# Patient Record
(demographics unavailable — no encounter records)

---

## 2024-10-24 NOTE — PHYSICAL EXAM
[Normal Sclera/Conjunctiva] : normal sclera/conjunctiva [PERRL] : pupils equal round and reactive to light [Normal Posterior Cervical Nodes] : no posterior cervical lymphadenopathy [Normal Anterior Cervical Nodes] : no anterior cervical lymphadenopathy [Normal] : no joint swelling and grossly normal strength and tone [Coordination Grossly Intact] : coordination grossly intact [No Focal Deficits] : no focal deficits [Normal Gait] : normal gait [Normal Affect] : the affect was normal [Normal Insight/Judgement] : insight and judgment were intact

## 2024-10-24 NOTE — PAST MEDICAL HISTORY
[Menstruating] : menstruating [Definite ___ (Date)] : the last menstrual period was [unfilled] [Irregular Cycle Intervals] : are  irregular [Dysmenorrhea] : dysmenorrhea [Total Preg ___] : G[unfilled] [Live Births ___] : P[unfilled]  [AB Spont ___] : miscarriages: [unfilled]

## 2024-10-28 NOTE — HEALTH RISK ASSESSMENT
[Good] : ~his/her~  mood as  good [No] : No [Never (0 pts)] : Never (0 points) [No falls in past year] : Patient reported no falls in the past year [0] : 2) Feeling down, depressed, or hopeless: Not at all (0) [PHQ-2 Negative - No further assessment needed] : PHQ-2 Negative - No further assessment needed [Never] : Never [NO] : No [Patient reported PAP Smear was abnormal] : Patient reported PAP Smear was abnormal [HIV Test offered] : HIV Test offered [Hepatitis C test offered] : Hepatitis C test offered [With Family] : lives with family [# of Members in Household ___] :  household currently consist of [unfilled] member(s) [Employed] : employed [Significant Other] : lives with significant other [Sexually Active] : sexually active [Feels Safe at Home] : Feels safe at home [Reports changes in vision] : Reports changes in vision [Patient declined PAP Smear] : Patient declined PAP Smear [HIV test declined] : HIV test declined [Hepatitis C test declined] : Hepatitis C test declined [Fully functional (bathing, dressing, toileting, transferring, walking, feeding)] : Fully functional (bathing, dressing, toileting, transferring, walking, feeding) [Fully functional (using the telephone, shopping, preparing meals, housekeeping, doing laundry, using] : Fully functional and needs no help or supervision to perform IADLs (using the telephone, shopping, preparing meals, housekeeping, doing laundry, using transportation, managing medications and managing finances) [[id number]] : 0 [Reports changes in hearing] : Reports no changes in hearing [PapSmearDate] : 11/2023 [PapSmearComments] : colpo Feb 2024 showing CIN1/2 [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

## 2024-10-28 NOTE — HISTORY OF PRESENT ILLNESS
[FreeTextEntry1] : CPE [de-identified] : 32 yo f with hx of temporal lobe epilepsy, BEBE 2/3 presenting for CPE. She reports she has issues seeing far, does not wear glasses.

## 2024-10-28 NOTE — HEALTH RISK ASSESSMENT
[Good] : ~his/her~  mood as  good [No] : No [Never (0 pts)] : Never (0 points) [No falls in past year] : Patient reported no falls in the past year [0] : 2) Feeling down, depressed, or hopeless: Not at all (0) [PHQ-2 Negative - No further assessment needed] : PHQ-2 Negative - No further assessment needed [Never] : Never [NO] : No [Patient reported PAP Smear was abnormal] : Patient reported PAP Smear was abnormal [HIV Test offered] : HIV Test offered [Hepatitis C test offered] : Hepatitis C test offered [With Family] : lives with family [# of Members in Household ___] :  household currently consist of [unfilled] member(s) [Employed] : employed [Significant Other] : lives with significant other [Sexually Active] : sexually active [Feels Safe at Home] : Feels safe at home [Reports changes in vision] : Reports changes in vision [Patient declined PAP Smear] : Patient declined PAP Smear [HIV test declined] : HIV test declined [Hepatitis C test declined] : Hepatitis C test declined [Fully functional (bathing, dressing, toileting, transferring, walking, feeding)] : Fully functional (bathing, dressing, toileting, transferring, walking, feeding) [Fully functional (using the telephone, shopping, preparing meals, housekeeping, doing laundry, using] : Fully functional and needs no help or supervision to perform IADLs (using the telephone, shopping, preparing meals, housekeeping, doing laundry, using transportation, managing medications and managing finances) [[id number]] : 0 [Reports changes in hearing] : Reports no changes in hearing [PapSmearDate] : 11/2023 [PapSmearComments] : colpo Feb 2024 showing CIN1/2 [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

## 2024-10-28 NOTE — HISTORY OF PRESENT ILLNESS
[FreeTextEntry1] : CPE [de-identified] : 32 yo f with hx of temporal lobe epilepsy, BEBE 2/3 presenting for CPE. She reports she has issues seeing far, does not wear glasses.

## 2024-10-28 NOTE — HISTORY OF PRESENT ILLNESS
[FreeTextEntry1] : CPE [de-identified] : 32 yo f with hx of temporal lobe epilepsy, BEBE 2/3 presenting for CPE. She reports she has issues seeing far, does not wear glasses.

## 2024-10-28 NOTE — INTERPRETER SERVICES
[[employer] ] : provided by [employer]   [Interpreters_IDNumber] : [id number] [Interpreters_FullName] : imelda baehna  [TWNoteComboBox1] : Italian

## 2024-10-28 NOTE — HEALTH RISK ASSESSMENT
[Good] : ~his/her~  mood as  good [No] : No [Never (0 pts)] : Never (0 points) [No falls in past year] : Patient reported no falls in the past year [0] : 2) Feeling down, depressed, or hopeless: Not at all (0) [PHQ-2 Negative - No further assessment needed] : PHQ-2 Negative - No further assessment needed [Never] : Never [NO] : No [Patient reported PAP Smear was abnormal] : Patient reported PAP Smear was abnormal [HIV Test offered] : HIV Test offered [Hepatitis C test offered] : Hepatitis C test offered [With Family] : lives with family [# of Members in Household ___] :  household currently consist of [unfilled] member(s) [Employed] : employed [Significant Other] : lives with significant other [Sexually Active] : sexually active [Feels Safe at Home] : Feels safe at home [Reports changes in vision] : Reports changes in vision [Patient declined PAP Smear] : Patient declined PAP Smear [HIV test declined] : HIV test declined [Hepatitis C test declined] : Hepatitis C test declined [Fully functional (bathing, dressing, toileting, transferring, walking, feeding)] : Fully functional (bathing, dressing, toileting, transferring, walking, feeding) [Fully functional (using the telephone, shopping, preparing meals, housekeeping, doing laundry, using] : Fully functional and needs no help or supervision to perform IADLs (using the telephone, shopping, preparing meals, housekeeping, doing laundry, using transportation, managing medications and managing finances) [[id number]] : 0 [Reports changes in hearing] : Reports no changes in hearing [PapSmearDate] : 11/2023 [PapSmearComments] : colpo Feb 2024 showing CIN1/2 [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

## 2024-10-28 NOTE — INTERPRETER SERVICES
[[employer] ] : provided by [employer]   [Interpreters_IDNumber] : [id number] [Interpreters_FullName] : imelda bahena  [TWNoteComboBox1] : Belizean

## 2024-10-28 NOTE — INTERPRETER SERVICES
[[employer] ] : provided by [employer]   [Interpreters_IDNumber] : [id number] [Interpreters_FullName] : imelda bahena  [TWNoteComboBox1] : Pakistani

## 2025-01-17 NOTE — ASSESSMENT
[FreeTextEntry1] : SHARON GARCIA, 31 yo RHF PMHs significant for KENYON July 2020, right temporal with two typical complex partial seizure about in May and October 2021, nocturnal convulsions April 2024 Lacosamide 200 mg bid and Zonisamide 200 mg qhs well tolerated.  Last seizure 6 months ago. brief aura, feeling seizurish.  IUP 3-4 weeks pregnant   We extensively discussed that there are no adequate and well-controlled studies on use of zonisamide or lacosamide in pregnant women, however, animal reproductive studies show evidence of fetal malformations and loss of pregnancy. Benefit outweigh the risk, advised to continue Lacosamide 200 mg bid and Zonisamide 200 mg qhs. Patient verbalizes understanding.    Plan: - Plan of care was discussed with Dr Devine attending. - Con Lacosamide 200 mg q12.well tolerated  - Cont Zonisamide 200 qhs well tolerated, knows to hydrate.  - ordered cbc cmp, lft trough level asap, prev office visit has  April 2024 3 convulsions in the same day while she was asleep which patient has no recollections - discussed with patient will repeat levels 2nd trimester then 3 rd  - will consider increase of ZNS if low level, or aura reoccur - Cont folic acid - Establish OB care - reviewed seizure triggers -  all questions answered - MRI Head w/wo will hold off  - RTC with NP Farole end of April 2025, know to reach out sooner prn   x time 35 min

## 2025-01-17 NOTE — HISTORY OF PRESENT ILLNESS
[FreeTextEntry1] : Current meds: Lacosamide 200 mg bid well tolerated Zonisamide 200 mg qhs well tolerated Folic acid 1 mg  Prev  LEV Lamotrigine    ***2025*** accompanied by sister who helps to interpret SHARON ROSA, 31 yo RHF PMHs significant for KENYON  2020, right temporal.  She is pregnant about 3-4 weeks confirmed by home pregnancy test, she has not seen OB yet   She feels good. She has one healthy child 17 yo. she has not been on ASMs at that time, her seizures started afterwards.   Seizures started at age 18 had convulsive seizures, then had KENYON afterwards has brief auras, last 6 months ago. She is compliant with Lacosamide 200 mg bid and Zonisamide 200 mg qhs well tolerated, also on folic acid.     ***4/15/2024*** Ms Sharon Rosa is here today for a scheduled follow up office visit [id number] [place] [address]  disconnected and reconnected with  ID# /[id number] Guilherme Ms Hinds was pregnant but had an  on  2024   4 or 5 days before  she had 3 convulsions in the same day while she was asleep She reports one ne other reported episode of confusion/ [place] for one minute about one year ago  Lacosamide 200 mg BID Zonosamide 200mg daily  *** 2022  ***  Ms. ROSA reports that she has not had any additional seizures since her last visit 2-1/2 months ago.  She is tolerating the increased dose of zonisamide 200 mg at bedtime without side effects.  She continues taking lacosamide 200 mg twice a day. Ms. ROSA endorses that she continues having problems with her memory, and is often frustrated that simple tasks take her much longer to perform than she feels they should.  She is not having any pressure from her workplace, on the contrary they often ask her why she is so upset.  At last visit, she was given referral to see neuropsychology for follow-up testing, but has not made an appointment.  *** 2021  ***  Ms. ROSA reports that she had break through seizure on Oct 4 2021. She does not think that she missed doses or was sleep deprived. currently taking Vimpat 200 q12, zonisamide 100 mg qHS.   Ms. ROSA endorses subjectively worse memory, and that she is slow to do things-suggesting possibly some executive dysfunction. Ms. ROSA is not currently planning to have children, but does not rule out the possibility in the future.  *** 06/15/2021  ***  Ms. ROSA reports only 1 seizure since surgery 2020 (KENYON).  Ms. ROSA was very stressed over the 2 days prior to seizure.  Relative reports that she postured, lasted about 30 sec, typical semiology.    Vimpat 200 mg q12 zonisamide 100 mg qHS folic acid.  *** 2020  *** -Appointment was conducted by video-conference in place of office appointment due to due to heightened concern for coronavirus infection risk. -Verbal consent given on May 24, 2020 at 15:41 by the patient SHARON ROSA ( Dec 25 1992) who understands that tele-visit will be charged to insurance and may involve co-pay for patient. -Video Platform: "[employer]  Doxy.[email or web address] Ng  -Physician location: home -Patient location: home - [address] [place] [address]  -Individuals on call: Dr. Serna, SHARON ROSA underwent stereo-EEG last January -  seizures recorded, 14/16 began in R mesial temporal structures with secondary initiatin of seizure in left hemisphere, 2/14 were seen first in left mesial temporal contacts - thougth implain in the left was sparse compared to the right. Ms. ROSA has continued to have seizures over the last few months. Approximately 1-2 per month. No convulsions.  Ms. ROSA is interested in surgery, and has met with Dr. Felipe to discuss options. Currently considering R temporal resection, though alternative could be RNS for further data collection.  On the other hand, R temporal lobectomy could be followed by RNS if residual seizures were not controlled by medication.   Ms. ROSA identifies memory loss as one of her greatest concerns and problems.   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 She was going to be evaluated for epilepsy surgery, but became uninsured. She is now again insured and returns for follow-up.  She reports that she had convulsion this morning and continues to get frequent complex partial seizures. She also complains of marked worsening of memory. She had recent neuropsychology evaluation, results pending.    *** 2018 *** 26 y/o RH woman from [place] with history of temporal lobe epilepsy diagnosed with at age 16. First episode happened at 9yo, described as confused feeling, with feeling of maddi vu and throat tightness without motor symptoms. She reports her events now consist of a as a preceding aura of a maddi-vu feeling lump in her throat followed by sense of confusion, stiffening of the body and oral automatisms.   Seizures last 1-2 mins. She has 3-5 of her typical seizures monthly, especially clustering around the time of her periods. Since increasing her dose of keppra and lamotrigine at discharge 5d ago pt has had a typical aura but did not progress.  Patient recalls that she may have seizures if she get more emotional or upset.  Seizures are usually worst guillermo-menstrually, usually 3-4 days before menses.    Current AEDs - Patient is currently taking Keppra 750 mg BID and lamotrigine 100 mg BID  Past AEDs - Keppra, carbamazepine (did not seem to help as much - got more post-ictal HA while taking cbz), phenobarbital, phenytoin. Has not tried other AEDs.  All - None  Soc - tobacco, no ETOH  FH - [place]  ROS - review of 14 systems negative.  *** Recent Hospital Eval *** 26 y/o RH woman from [place] with history of temporal lobe epilepsy admitted to EMU s/p multiple seizures today. Pt was diagnosed with temporal lobe epilepsy at age 16. She reports her events as a preceding aura of a clump in her throat followed by sense of confusion and witnessed tonic clonic activity in all extremities. Oral automatisms may have been observed. Events last 1-2 mins. In the past she was treated with Carbamazepine, Dilantin, and Phenobarbital (unclear if intolerance or medication failure). Patient is currently taking Keppra 750 mg BID and lamotrigine 100 mg BID. She continues to have 3-5 of her typical seizures monthly, especially clustering around the time of her periods.  Patient had 5 witnessed seizures at home this month. Initially they were typical length and she returned to baseline mental status afterwards. Her last 3 seizures lasted 5 mins each, were clustered together, and she did not return to baseline mental status. She was taken to [facility], where she had another seizure. There, she received 2 mg ativan and 500 mg phenobarbital IV. CTH was normal.

## 2025-01-17 NOTE — HISTORY OF PRESENT ILLNESS
[FreeTextEntry1] : Current meds: Lacosamide 200 mg bid well tolerated Zonisamide 200 mg qhs well tolerated Folic acid 1 mg  Prev  LEV Lamotrigine    ***2025*** accompanied by sister who helps to interpret SHARON ROSA, 33 yo RHF PMHs significant for KENYON  2020, right temporal.  She is pregnant about 3-4 weeks confirmed by home pregnancy test, she has not seen OB yet   She feels good. She has one healthy child 17 yo. she has not been on ASMs at that time, her seizures started afterwards.   Seizures started at age 18 had convulsive seizures, then had KENYON afterwards has brief auras, last 6 months ago. She is compliant with Lacosamide 200 mg bid and Zonisamide 200 mg qhs well tolerated, also on folic acid.     ***4/15/2024*** Ms Sharon Rosa is here today for a scheduled follow up office visit [id number] [place] [address]  disconnected and reconnected with  ID# /[id number] Guilherme Ms Hinds was pregnant but had an  on  2024   4 or 5 days before  she had 3 convulsions in the same day while she was asleep She reports one ne other reported episode of confusion/ [place] for one minute about one year ago  Lacosamide 200 mg BID Zonosamide 200mg daily  *** 2022  ***  Ms. ROSA reports that she has not had any additional seizures since her last visit 2-1/2 months ago.  She is tolerating the increased dose of zonisamide 200 mg at bedtime without side effects.  She continues taking lacosamide 200 mg twice a day. Ms. ROSA endorses that she continues having problems with her memory, and is often frustrated that simple tasks take her much longer to perform than she feels they should.  She is not having any pressure from her workplace, on the contrary they often ask her why she is so upset.  At last visit, she was given referral to see neuropsychology for follow-up testing, but has not made an appointment.  *** 2021  ***  Ms. ROSA reports that she had break through seizure on Oct 4 2021. She does not think that she missed doses or was sleep deprived. currently taking Vimpat 200 q12, zonisamide 100 mg qHS.   Ms. ROSA endorses subjectively worse memory, and that she is slow to do things-suggesting possibly some executive dysfunction. Ms. ROSA is not currently planning to have children, but does not rule out the possibility in the future.  *** 06/15/2021  ***  Ms. ROSA reports only 1 seizure since surgery 2020 (KENYON).  Ms. ROSA was very stressed over the 2 days prior to seizure.  Relative reports that she postured, lasted about 30 sec, typical semiology.    Vimpat 200 mg q12 zonisamide 100 mg qHS folic acid.  *** 2020  *** -Appointment was conducted by video-conference in place of office appointment due to due to heightened concern for coronavirus infection risk. -Verbal consent given on May 24, 2020 at 15:41 by the patient SHARON ROSA ( Dec 25 1992) who understands that tele-visit will be charged to insurance and may involve co-pay for patient. -Video Platform: [employer]  Doxy.[email or web address] Ng  -Physician location: home -Patient location: home - [address] [place] [address]  -Individuals on call: Dr. Serna, SHARON ROSA underwent stereo-EEG last January -  seizures recorded, 14/16 began in R mesial temporal structures with secondary initiatin of seizure in left hemisphere, 2/14 were seen first in left mesial temporal contacts - thougth implain in the left was sparse compared to the right. Ms. ROSA has continued to have seizures over the last few months. Approximately 1-2 per month. No convulsions.  Ms. ROSA is interested in surgery, and has met with Dr. Felipe to discuss options. Currently considering R temporal resection, though alternative could be RNS for further data collection.  On the other hand, R temporal lobectomy could be followed by RNS if residual seizures were not controlled by medication.   Ms. ROSA identifies memory loss as one of her greatest concerns and problems.   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 She was going to be evaluated for epilepsy surgery, but became uninsured. She is now again insured and returns for follow-up.  She reports that she had convulsion this morning and continues to get frequent complex partial seizures. She also complains of marked worsening of memory. She had recent neuropsychology evaluation, results pending.    *** 2018 *** 26 y/o RH woman from [place] with history of temporal lobe epilepsy diagnosed with at age 16. First episode happened at 9yo, described as confused feeling, with feeling of maddi vu and throat tightness without motor symptoms. She reports her events now consist of a as a preceding aura of a maddi-vu feeling lump in her throat followed by sense of confusion, stiffening of the body and oral automatisms.   Seizures last 1-2 mins. She has 3-5 of her typical seizures monthly, especially clustering around the time of her periods. Since increasing her dose of keppra and lamotrigine at discharge 5d ago pt has had a typical aura but did not progress.  Patient recalls that she may have seizures if she get more emotional or upset.  Seizures are usually worst guillermo-menstrually, usually 3-4 days before menses.    Current AEDs - Patient is currently taking Keppra 750 mg BID and lamotrigine 100 mg BID  Past AEDs - Keppra, carbamazepine (did not seem to help as much - got more post-ictal HA while taking cbz), phenobarbital, phenytoin. Has not tried other AEDs.  All - None  Soc - tobacco, no ETOH  FH - [place]  ROS - review of 14 systems negative.  *** Recent Hospital Eval *** 26 y/o RH woman from [place] with history of temporal lobe epilepsy admitted to EMU s/p multiple seizures today. Pt was diagnosed with temporal lobe epilepsy at age 16. She reports her events as a preceding aura of a clump in her throat followed by sense of confusion and witnessed tonic clonic activity in all extremities. Oral automatisms may have been observed. Events last 1-2 mins. In the past she was treated with Carbamazepine, Dilantin, and Phenobarbital (unclear if intolerance or medication failure). Patient is currently taking Keppra 750 mg BID and lamotrigine 100 mg BID. She continues to have 3-5 of her typical seizures monthly, especially clustering around the time of her periods.  Patient had 5 witnessed seizures at home this month. Initially they were typical length and she returned to baseline mental status afterwards. Her last 3 seizures lasted 5 mins each, were clustered together, and she did not return to baseline mental status. She was taken to [facility], where she had another seizure. There, she received 2 mg ativan and 500 mg phenobarbital IV. CTH was normal.

## 2025-01-31 NOTE — DISCUSSION/SUMMARY
[FreeTextEntry1] : 32-year-old -0-0-1 LMP 2024 EDC 9/10/2025 8 weeks 1 day today Positive pregnancy test, +SIUP on pelvic us History of epilepsy on meds refer to MFM Follow-up with neurology Follow-up 2 weeks as a new OB visit Nips at that time NT OB ultrasound

## 2025-01-31 NOTE — HISTORY OF PRESENT ILLNESS
[FreeTextEntry1] : 32-year-old -0-0-1 LMP 2024 EDC 9/10/2025 8 weeks 1 day today Patient has a positive urine pregnancy test here for confirmation of pregnancy History of convulsions epilepsy x 4 years patient currently on 2 medications are concerned about the medication and the pregnancy neurologist aware and discussed the medication during pregnancy Patient has an appointment regarding adjusting the medication because it makes him too low next week

## 2025-04-25 NOTE — HISTORY OF PRESENT ILLNESS
[FreeTextEntry1] : OB Laurie Zamarripa  Current meds: Lacosamide 200 mg bid Zonisamide 200 mg qhs  Folic acid Now pregnant 20 weeks   Prev  LEV Lamotrigine    *** 2025 **** use of  Vari # [id number].  SHARON ROSA, 31 yo RHF PMHs significant for KENYON 2020, right temporal, afterwards with brief auras. Now pregnant 20 weeks feels good. SKYLER 12/10/2025. No interval sz on Lacosamide 200 mg bid and Zonisamide 200 mg qhs. Needs updated level.    ***2025*** accompanied by sister who helps to interpret SHARON ROSA, 31 yo RHF PMHs significant for KENYON 2020, right temporal.  She is pregnant about 3-4 weeks confirmed by home pregnancy test, she has not seen OB yet   She feels good. She has one healthy child 15 yo. she has not been on ASMs at that time, her seizures started afterwards.   Seizures started at age 18 had convulsive seizures, then had KENYON afterwards has brief auras, last 6 months ago. She is compliant with Lacosamide 200 mg bid and Zonisamide 200 mg qhs well tolerated, also on folic acid.     ***4/15/2024*** Ms Sharon Rosa is here today for a scheduled follow up office visit [id number] Jaimie  disconnected and reconnected with  ID# /[id number] Guilherme Ms Hinds was pregnant but had an  on  2024   4 or 5 days before  she had 3 convulsions in the same day while she was asleep She reports one ne other reported episode of confusion/ [place] for one minute about one year ago  Lacosamide 200 mg BID Zonosamide 200mg daily  *** 2022  ***  Ms. ROSA reports that she has not had any additional seizures since her last visit 2-1/2 months ago.  She is tolerating the increased dose of zonisamide 200 mg at bedtime without side effects.  She continues taking lacosamide 200 mg twice a day. Ms. ROSA endorses that she continues having problems with her memory, and is often frustrated that simple tasks take her much longer to perform than she feels they should.  She is not having any pressure from her workplace, on the contrary they often ask her why she is so upset.  At last visit, she was given referral to see neuropsychology for follow-up testing, but has not made an appointment.  *** 2021  ***  Ms. ROSA reports that she had break through seizure on Oct 4 2021. She does not think that she missed doses or was sleep deprived. currently taking Vimpat 200 q12, zonisamide 100 mg qHS.   Ms. ROSA endorses subjectively worse memory, and that she is slow to do things-suggesting possibly some executive dysfunction. Ms. ROSA is not currently planning to have children, but does not rule out the possibility in the future.  *** 06/15/2021  ***  Ms. ROSA reports only 1 seizure since surgery 2020 (KENYON).  Ms. ROSA was very stressed over the 2 days prior to seizure.  Relative reports that she postured, lasted about 30 sec, typical semiology.    Vimpat 200 mg q12 zonisamide 100 mg qHS folic acid.  *** 2020  *** -Appointment was conducted by video-conference in place of office appointment due to due to heightened concern for coronavirus infection risk. -Verbal consent given on May 24, 2020 at 15:41 by the patient SHARON ROSA ( Dec 25 1992) who understands that tele-visit will be charged to insurance and may involve co-pay for patient. -Video Platform: [email or web address] Doximity  -Physician location: home -Patient location: [facility] - [address] [place] [address], [place] [address]  -Individuals on call: Dr. Serna, SHARON ROSA underwent stereo-EEG last January -  seizures recorded, 14/16 began in R mesial temporal structures with secondary initiatin of seizure in left hemisphere, 2/14 were seen first in left mesial temporal contacts - [facility] implain in the left was sparse compared to the right. Ms. ROSA has continued to have seizures over the last few months. Approximately 1-2 per month. No convulsions.  Ms. ROSA is interested in surgery, and has met with Dr. Felipe to discuss options. Currently considering R temporal resection, though alternative could be RNS for further data collection.  On the other hand, R temporal lobectomy could be followed by RNS if residual seizures were not controlled by medication.   Ms. ROSA identifies memory loss as one of her greatest concerns and problems.   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 She was going to be evaluated for epilepsy surgery, but became uninsured. She is now again insured and returns for follow-up.  She reports that she had convulsion this morning and continues to get frequent complex partial seizures. She also complains of marked worsening of memory. She had recent neuropsychology evaluation, results pending.    *** 2018 *** 24 y/o RH woman from [place] with history of temporal lobe epilepsy diagnosed with at age 16. First episode happened at 9yo, described as confused feeling, with feeling of maddi vu and throat tightness without motor symptoms. She reports her events now consist of a as a preceding aura of a maddi-vu feeling lump in her throat followed by sense of confusion, stiffening of the body and oral automatisms.   Seizures last 1-2 mins. She has 3-5 of her typical seizures monthly, especially clustering around the time of her periods. Since increasing her dose of keppra and lamotrigine at discharge 5d ago pt has had a typical aura but did not progress.  Patient recalls that she may have seizures if she get more emotional or upset.  Seizures are usually worst guillermo-menstrually, usually 3-4 days before menses.    Current AEDs - Patient is currently taking Keppra 750 mg BID and lamotrigine 100 mg BID  Past AEDs - Keppra, carbamazepine (did not seem to help as much - got more post-ictal HA while taking cbz), phenobarbital, phenytoin. Has not tried other AEDs.  All - None  Soc - tobacco, no ETOH  FH - NC  ROS - review of 14 systems negative.  *** Recent Hospital Eval *** 24 y/o RH woman from [place] with history of temporal lobe epilepsy admitted to EMU s/p multiple seizures today. Pt was diagnosed with temporal lobe epilepsy at age 16. She reports her events as a preceding aura of a clump in her throat followed by sense of confusion and witnessed tonic clonic activity in all extremities. Oral automatisms may have been observed. Events last 1-2 mins. In the past she was treated with Carbamazepine, Dilantin, and Phenobarbital (unclear if intolerance or medication failure). Patient is currently taking Keppra 750 mg BID and lamotrigine 100 mg BID. She continues to have 3-5 of her typical seizures monthly, especially clustering around the time of her periods.  Patient had 5 witnessed seizures at home this month. Initially they were typical length and she returned to baseline mental status afterwards. Her last 3 seizures lasted 5 mins each, were clustered together, and she did not return to baseline mental status. She was taken to [facility], where she had another seizure. There, she received 2 mg ativan and 500 mg phenobarbital IV. CTH was normal.

## 2025-04-25 NOTE — HISTORY OF PRESENT ILLNESS
[FreeTextEntry1] : OB Laurie Zamarripa  Current meds: Lacosamide 200 mg bid Zonisamide 200 mg qhs  Folic acid Now pregnant 20 weeks   Prev  LEV Lamotrigine    *** 2025 **** use of  Vari # [id number].  SHARON ROSA, 31 yo RHF PMHs significant for KENYON 2020, right temporal, afterwards with brief auras. Now pregnant 20 weeks feels good. SKYLER 12/10/2025. No interval sz on Lacosamide 200 mg bid and Zonisamide 200 mg qhs. Needs updated level.    ***2025*** accompanied by sister who helps to interpret SHARON ROSA, 31 yo RHF PMHs significant for KENYON 2020, right temporal.  She is pregnant about 3-4 weeks confirmed by home pregnancy test, she has not seen OB yet   She feels good. She has one healthy child 15 yo. she has not been on ASMs at that time, her seizures started afterwards.   Seizures started at age 18 had convulsive seizures, then had KENYON afterwards has brief auras, last 6 months ago. She is compliant with Lacosamide 200 mg bid and Zonisamide 200 mg qhs well tolerated, also on folic acid.     ***4/15/2024*** Ms Sharon Rosa is here today for a scheduled follow up office visit [id number] Jaimie  disconnected and reconnected with  ID# /[id number] Guilherme Ms Hinds was pregnant but had an  on  2024   4 or 5 days before  she had 3 convulsions in the same day while she was asleep She reports one ne other reported episode of confusion/ [place]javu for one minute about one year ago  Lacosamide 200 mg BID Zonosamide 200mg daily  *** 2022  ***  Ms. ROSA reports that she has not had any additional seizures since her last visit 2-1/2 months ago.  She is tolerating the increased dose of zonisamide 200 mg at bedtime without side effects.  She continues taking lacosamide 200 mg twice a day. Ms. ROSA endorses that she continues having problems with her memory, and is often frustrated that simple tasks take her much longer to perform than she feels they should.  She is not having any pressure from her workplace, on the contrary they often ask her why she is so upset.  At last visit, she was given referral to see neuropsychology for follow-up testing, but has not made an appointment.  *** 2021  ***  Ms. ROSA reports that she had break through seizure on Oct 4 2021. She does not think that she missed doses or was sleep deprived. currently taking Vimpat 200 q12, zonisamide 100 mg qHS.   Ms. ROSA endorses subjectively worse memory, and that she is slow to do things-suggesting possibly some executive dysfunction. Ms. ROSA is not currently planning to have children, but does not rule out the possibility in the future.  *** 06/15/2021  ***  Ms. ROSA reports only 1 seizure since surgery 2020 (KENYON).  Ms. ROSA was very stressed over the 2 days prior to seizure.  Relative reports that she postured, lasted about 30 sec, typical semiology.    Vimpat 200 mg q12 zonisamide 100 mg qHS folic acid.  *** 2020  *** -Appointment was conducted by video-conference in place of office appointment due to due to heightened concern for coronavirus infection risk. -Verbal consent given on May 24, 2020 at 15:41 by the patient SHARON ROSA ( Dec 25 1992) who understands that tele-visit will be charged to insurance and may involve co-pay for patient. -Video Platform: [email or web address] Doximity  -Physician location: home -Patient location: [facility] - [address] [place] [address], [place] [address]  -Individuals on call: Dr. Serna, SHARON ROSA underwent stereo-EEG last January -  seizures recorded, 14/16 began in R mesial temporal structures with secondary initiatin of seizure in left hemisphere, 2/14 were seen first in left mesial temporal contacts - [facility] implain in the left was sparse compared to the right. Ms. ROSA has continued to have seizures over the last few months. Approximately 1-2 per month. No convulsions.  Ms. ROSA is interested in surgery, and has met with Dr. Felipe to discuss options. Currently considering R temporal resection, though alternative could be RNS for further data collection.  On the other hand, R temporal lobectomy could be followed by RNS if residual seizures were not controlled by medication.   Ms. ROSA identifies memory loss as one of her greatest concerns and problems.   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 She was going to be evaluated for epilepsy surgery, but became uninsured. She is now again insured and returns for follow-up.  She reports that she had convulsion this morning and continues to get frequent complex partial seizures. She also complains of marked worsening of memory. She had recent neuropsychology evaluation, results pending.    *** 2018 *** 26 y/o RH woman from [place] with history of temporal lobe epilepsy diagnosed with at age 16. First episode happened at 9yo, described as confused feeling, with feeling of maddi vu and throat tightness without motor symptoms. She reports her events now consist of a as a preceding aura of a maddi-vu feeling lump in her throat followed by sense of confusion, stiffening of the body and oral automatisms.   Seizures last 1-2 mins. She has 3-5 of her typical seizures monthly, especially clustering around the time of her periods. Since increasing her dose of keppra and lamotrigine at discharge 5d ago pt has had a typical aura but did not progress.  Patient recalls that she may have seizures if she get more emotional or upset.  Seizures are usually worst guillermo-menstrually, usually 3-4 days before menses.    Current AEDs - Patient is currently taking Keppra 750 mg BID and lamotrigine 100 mg BID  Past AEDs - Keppra, carbamazepine (did not seem to help as much - got more post-ictal HA while taking cbz), phenobarbital, phenytoin. Has not tried other AEDs.  All - None  Soc - tobacco, no ETOH  FH - NC  ROS - review of 14 systems negative.  *** Recent Hospital Eval *** 26 y/o RH woman from [place] with history of temporal lobe epilepsy admitted to EMU s/p multiple seizures today. Pt was diagnosed with temporal lobe epilepsy at age 16. She reports her events as a preceding aura of a clump in her throat followed by sense of confusion and witnessed tonic clonic activity in all extremities. Oral automatisms may have been observed. Events last 1-2 mins. In the past she was treated with Carbamazepine, Dilantin, and Phenobarbital (unclear if intolerance or medication failure). Patient is currently taking Keppra 750 mg BID and lamotrigine 100 mg BID. She continues to have 3-5 of her typical seizures monthly, especially clustering around the time of her periods.  Patient had 5 witnessed seizures at home this month. Initially they were typical length and she returned to baseline mental status afterwards. Her last 3 seizures lasted 5 mins each, were clustered together, and she did not return to baseline mental status. She was taken to [facility], where she had another seizure. There, she received 2 mg ativan and 500 mg phenobarbital IV. CTH was normal.

## 2025-04-25 NOTE — ASSESSMENT
[FreeTextEntry1] : SHARON GARCIA, 33 yo RHF PMHs significant for KENYON July 2020, right temporal with two typical complex partial seizure about in May and October 2021, nocturnal convulsions April 2024.  On Lacosamide 200 mg bid and Zonisamide 200 mg qhs well tolerated.  Brief aura June July 2024, feeling seizurish.  IUP 20 weeks pregnant   We extensively discussed that there are no adequate and well-controlled studies on use of zonisamide or lacosamide in pregnant women, however, animal reproductive studies show evidence of fetal malformations and loss of pregnancy. Benefits outweigh the risk, advised to continue Lacosamide 200 mg bid and Zonisamide 200 mg qhs. Patient verbalizes understanding.  Pt admits that 01.28.2025 last level was done on Zonisamide 100 mg qhs  2.1 (10.0-  40.0) ZNS was increased to 200 mg qhs. As per pt level was repeated by OB normal (no report avail)  no adjustments were recommended.    Plan: - I reached out to OB Antoine Zamarripa, pt is at high risk for seiuzure d/t questionable compliance.  -  Cont  Lacosamide 200mg q12.well tolerated. Renewed. -  Cont Zonisamide 200 mg qhs well tolerated, knows to hydrate. Renewed. Compliance discussed. Unclear of pt's insight.   -  Ordered trough levels ASAP, Will consider increase of ZNS if low level, or aura reoccur. -  Will repeat levels 2nd trimester then 3rd   - Cont folic acid by OB recomm -  Reviewed seizure triggers -  all questions answered -  MRI Head w/wo will hold off  -  RTC in 3 months with Dr Devine, know to reach out sooner prn   x time 25 min

## 2025-04-25 NOTE — REASON FOR VISIT
[Follow-Up: _____] : a [unfilled] follow-up visit [Language Line ] : provided by Language Line   [Time Spent: ____ minutes] : Total time spent using  services: [unfilled] minutes. The patient's primary language is not English thus required  services. [Interpreters_IDNumber] : [id number] [Interpreters_FullName] : Vari [TWNoteComboBox1] : Azerbaijani

## 2025-04-25 NOTE — DISCUSSION/SUMMARY
[Well-controlled] : well-controlled [Risks Associated with Driving/[place] Law] : As per my usual protocol, the patient was advised in regards to risks and driving privileges associated with the [place] Guidelines.  [Safety Recommendations] : The patient was advised in regards to the risk of seizures and general seizure safety recommendations including not to be bathing alone, climbing to high places and operating heavy machinery. [Compliance with Medications] : The importance of compliance with medications was reinforced. [Medication Side Effects] : High frequency and serious potential medication adverse effects were reviewed with the patient, including but not exclusive to psychiatric effects.  Information sheets on medication side effects were made available to the patient in our clinic.  The patient or advocate agrees to notify us for any concerns. [Teratogenicity] : Risks associated with AED use in pregnancy, teratogenicity and methods of contraception were discussed.  [Sleep Hygiene/Sleep Disruption Risks] : Sleep hygiene and the risks of sleep disruption were discussed. [Risk of Death] : Risk of death associated with seizures / SUDEP was discussed.

## 2025-04-25 NOTE — ASSESSMENT
[FreeTextEntry1] : SHARON GARCIA, 31 yo RHF PMHs significant for KENYON July 2020, right temporal with two typical complex partial seizure about in May and October 2021, nocturnal convulsions April 2024.  On Lacosamide 200 mg bid and Zonisamide 200 mg qhs well tolerated.  Brief aura June July 2024, feeling seizurish.  IUP 20 weeks pregnant   We extensively discussed that there are no adequate and well-controlled studies on use of zonisamide or lacosamide in pregnant women, however, animal reproductive studies show evidence of fetal malformations and loss of pregnancy. Benefits outweigh the risk, advised to continue Lacosamide 200 mg bid and Zonisamide 200 mg qhs. Patient verbalizes understanding.  Pt admits that 01.28.2025 last level was done on Zonisamide 100 mg qhs  2.1 (10.0-  40.0) ZNS was increased to 200 mg qhs. As per pt level was repeated by OB normal (no report avail)  no adjustments were recommended.    Plan: - I reached out to OB Antoine Zamarripa, pt is at high risk for seiuzure d/t questionable compliance.  -  Cont  Lacosamide 200mg q12.well tolerated. Renewed. -  Cont Zonisamide 200 mg qhs well tolerated, knows to hydrate. Renewed. Compliance discussed. Unclear of pt's insight.   -  Ordered trough levels ASAP, Will consider increase of ZNS if low level, or aura reoccur. -  Will repeat levels 2nd trimester then 3rd   - Cont folic acid by OB recomm -  Reviewed seizure triggers -  all questions answered -  MRI Head w/wo will hold off  -  RTC in 3 months with Dr Devine, know to reach out sooner prn   x time 25 min

## 2025-04-25 NOTE — REASON FOR VISIT
[Follow-Up: _____] : a [unfilled] follow-up visit [Language Line ] : provided by Language Line   [Time Spent: ____ minutes] : Total time spent using  services: [unfilled] minutes. The patient's primary language is not English thus required  services. [Interpreters_IDNumber] : [id number] [Interpreters_FullName] : Vari [TWNoteComboBox1] : Beninese

## 2025-04-25 NOTE — HISTORY OF PRESENT ILLNESS
[FreeTextEntry1] : Current meds: Lacosamide 200 mg bid well tolerated Zonisamide 200 mg qhs well tolerated Folic acid 1 mg  Prev  LEV Lamotrigine    *** 2025 **** SHARON ROSA, 33 yo RHF PMHs significant for KENYON  2020, right temporal.     ***2025*** accompanied by sister who helps to interpret SHARON ROSA, 33 yo RHF PMHs significant for KENYON  2020, right temporal.  She is pregnant about 3-4 weeks confirmed by home pregnancy test, she has not seen OB yet   She feels good. She has one healthy child 15 yo. she has not been on ASMs at that time, her seizures started afterwards.   Seizures started at age 18 had convulsive seizures, then had KENYON afterwards has brief auras, last 6 months ago. She is compliant with Lacosamide 200 mg bid and Zonisamide 200 mg qhs well tolerated, also on folic acid.     ***4/15/2024*** Ms Sharon Rosa is here today for a scheduled follow up office visit [id number] Jaimie  disconnected and reconnected with  ID# /[id number] Guilherme Ms Hinds was pregnant but had an  on  2024   4 or 5 days before  she had 3 convulsions in the same day while she was asleep She reports one ne other reported episode of confusion/ [place]u for one minute about one year ago  Lacosamide 200 mg BID Zonosamide 200mg daily  *** 2022  ***  Ms. ROSA reports that she has not had any additional seizures since her last visit 2-1/2 months ago.  She is tolerating the increased dose of zonisamide 200 mg at bedtime without side effects.  She continues taking lacosamide 200 mg twice a day. Ms. ROSA endorses that she continues having problems with her memory, and is often frustrated that simple tasks take her much longer to perform than she feels they should.  She is not having any pressure from her workplace, on the contrary they often ask her why she is so upset.  At last visit, she was given referral to see neuropsychology for follow-up testing, but has not made an appointment.  *** 2021  ***  Ms. ROSA reports that she had break through seizure on Oct 4 2021. She does not think that she missed doses or was sleep deprived. currently taking Vimpat 200 q12, zonisamide 100 mg qHS.   Ms. ROSA endorses subjectively worse memory, and that she is slow to do things-suggesting possibly some executive dysfunction. Ms. ROSA is not currently planning to have children, but does not rule out the possibility in the future.  *** 06/15/2021  ***  Ms. ROSA reports only 1 seizure since surgery 2020 (KENYON).  Ms. ROSA was very stressed over the 2 days prior to seizure.  Relative reports that she postured, lasted about 30 sec, typical semiology.    Vimpat 200 mg q12 zonisamide 100 mg qHS folic acid.  *** 2020  *** -Appointment was conducted by video-conference in place of office appointment due to due to heightened concern for coronavirus infection risk. -Verbal consent given on May 24, 2020 at 15:41 by the patient SHARON ROSA ( Dec 25 1992) who understands that tele-visit will be charged to insurance and may involve co-pay for patient. -Video Platform: [email or web address]  -Physician location: home -Patient location: home - [address] [place] [address]  -Individuals on call: Dr. Serna, SHARON ROSA underwent stereo-EEG last January -  seizures recorded, 14/16 began in R mesial temporal structures with secondary initiatin of seizure in left hemisphere, 2/14 were seen first in left mesial temporal contacts - thougth implain in the left was sparse compared to the right. Ms. ROSA has continued to have seizures over the last few months. Approximately 1-2 per month. No convulsions.  Ms. ROSA is interested in surgery, and has met with Dr. Felipe to discuss options. Currently considering R temporal resection, though alternative could be RNS for further data collection.  On the other hand, R temporal lobectomy could be followed by RNS if residual seizures were not controlled by medication.   Ms. ROSA identifies memory loss as one of her greatest concerns and problems.   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 She was going to be evaluated for epilepsy surgery, but became uninsured. She is now again insured and returns for follow-up.  She reports that she had convulsion this morning and continues to get frequent complex partial seizures. She also complains of marked worsening of memory. She had recent neuropsychology evaluation, results pending.    *** 2018 *** 26 y/o RH woman from [place] with history of temporal lobe epilepsy diagnosed with at age 16. First episode happened at 9yo, described as confused feeling, with feeling of maddi vu and throat tightness without motor symptoms. She reports her events now consist of a as a preceding aura of a maddi-vu feeling lump in her throat followed by sense of confusion, stiffening of the body and oral automatisms.   Seizures last 1-2 mins. She has 3-5 of her typical seizures monthly, especially clustering around the time of her periods. Since increasing her dose of keppra and lamotrigine at discharge 5d ago pt has had a typical aura but did not progress.  Patient recalls that she may have seizures if she get more emotional or upset.  Seizures are usually worst guillermo-menstrually, usually 3-4 days before menses.    Current AEDs - Patient is currently taking Keppra 750 mg BID and lamotrigine 100 mg BID  Past AEDs - Keppra, carbamazepine (did not seem to help as much - got more post-ictal HA while taking cbz), phenobarbital, phenytoin. Has not tried other AEDs.  All - None  Soc - tobacco, no ETOH  FH - NC  ROS - review of 14 systems negative.  *** Recent Hospital Eval *** 26 y/o RH woman from [place] with history of temporal lobe epilepsy admitted to EMU s/p multiple seizures today. Pt was diagnosed with temporal lobe epilepsy at age 16. She reports her events as a preceding aura of a clump in her throat followed by sense of confusion and witnessed tonic clonic activity in all extremities. Oral automatisms may have been observed. Events last 1-2 mins. In the past she was treated with Carbamazepine, Dilantin, and Phenobarbital (unclear if intolerance or medication failure). Patient is currently taking Keppra 750 mg BID and lamotrigine 100 mg BID. She continues to have 3-5 of her typical seizures monthly, especially clustering around the time of her periods.  Patient had 5 witnessed seizures at home this month. Initially they were typical length and she returned to baseline mental status afterwards. Her last 3 seizures lasted 5 mins each, were clustered together, and she did not return to baseline mental status. She was taken to [facility], where she had another seizure. There, she received 2 mg ativan and 500 mg phenobarbital IV. CTH was normal.

## 2025-07-10 NOTE — PLAN
[FreeTextEntry1] : Patient is a 33yo  now PPD#21 s/p  at 28wks after IOL for IUFD. [facility]   #IUFD - Cytogenetics results still pending - APLS labs neg - Infectious labs overall unremarkable - Condolences and support provided today. Pt denies thoughts of harm to self or others. Does not wish to speak with social work today due to support felt with physician visit.  - Pt encouraged to schedule pre-conception counseling prior to trying to conceive to ensure from epilepsy standpoint she is optimized for a subsequent pregnancy. Phone numbers for both [facility] and [facility] provided. - No additional follow up in GYN clinic indicated  #h/o BEBE II - Normal pap in this pregnancy (NILM/HPV neg), repeat in 1yr  d/w attending, Dr. Kaye Farrar PGY4

## 2025-07-10 NOTE — HISTORY OF PRESENT ILLNESS
[FreeTextEntry1] : Patient is a 33yo  now PPD#21 s/p  at 28wks after IOL for IUFD. Patient had been following closely in [facility] for maternal history of temporal lobe epilepsy. She presented to [facility] for a routine scan and visit, was diagnosed with an IUFD. Pt then presented to [facility] triage for a second opinion and opted to stay for labor induction. Patient delivered intact non-viable fetus in double footling breech presentation, delivery overall uncomplicated. Pt sent cytogenetic testing and is anxious for results.   Otherwise reports she is physically recovering well. No longer bleeding vaginally or having breastmilk expression. Denies fevers, chills, abdominal pain, foul smelling vaginal discharge. She is appropriately sad though reports good support at home from sister, mother and partner.   No seizure activity since delivery.  PMH: Temporal lobe epilepsy Meds: Locasamide 200mg BID, Zonisamide 300mg PShx: laser ablation for epilepsy GYN hx: Fibroids, CIN2 (normal NILM/HPV neg pap in this pregnancy), Chlamydia 2x OB hx: FT  (), PT  for IUFD @28wks, SAB x1

## 2025-07-10 NOTE — PHYSICAL EXAM
[Appropriately responsive] : appropriately responsive [Alert] : alert [No Acute Distress] : no acute distress [Soft] : soft [Non-tender] : non-tender [Non-distended] : non-distended [Oriented x3] : oriented x3 [FreeTextEntry4] : well perfused [FreeTextEntry5] : normal respiratory effort on room air [FreeTextEntry7] : Pt declined pelvic exam.

## 2025-07-27 NOTE — HISTORY OF PRESENT ILLNESS
[FreeTextEntry1] : [facility]  Current meds: Lacosamide 200 mg bid Zonisamide 300 mg qhs or ?400mg qhs Folic acid  Prev  LEV Lamotrigine    *** 2025 ***  Maude # [id number] and partner is on the phone  SHARON ROSA, 33 yo RHF w/PMHs significant for KENYON 2020, right temporal, afterwards with brief auras here for a follow up. . she had nocturnal seizure, her partner heard a noise, denies convulsions, did not call 911, She felt tired next morning, went to see OBGYN was diagnosed w/IUFD, had labor induction. Delma 15/16/17 missed Zonisamiide 300 mg qhs as pharmacy did not give it, ? insurance not covering it During the discussion she changes the days to . Now she is on  mg bid, and zns not sure 300 mg or 400 mg. Here as was referrreed by OB to follow up on ASM doses reductons.      *** 2025 **** use of  Brian # [id number].  SHARON ROSA, 33 yo RHF PMHs significant for KENYON 2020, right temporal, afterwards with brief auras. Now pregnant 20 weeks feels good. SKYLER 12/10/2025. No interval sz on Lacosamide 200 mg bid and Zonisamide 200 mg qhs. Needs updated level.    ***2025*** accompanied by sister who helps to interpret SHARON ROSA, 33 yo RHF PMHs significant for KENYON 2020, right temporal.  She is pregnant about 3-4 weeks confirmed by home pregnancy test, she has not seen OB yet   She feels good. She has one healthy child 17 yo. she has not been on ASMs at that time, her seizures started afterwards.   Seizures started at age 18 had convulsive seizures, then had KENYON afterwards has brief auras, last 6 months ago. She is compliant with Lacosamide 200 mg bid and Zonisamide 200 mg qhs well tolerated, also on folic acid.     ***4/15/2024*** Ms Sharon Rosa is here today for a scheduled follow up office visit [id number] Jaimie  disconnected and reconnected with  ID# /[id number] Guilherme Hinds was pregnant but had an  on  2024   4 or 5 days before  she had 3 convulsions in the same day while she was asleep She reports one ne other reported episode of confusion/ [place]javu for one minute about one year ago Lacosamide 200 mg BID Zonosamide 200mg daily  *** 2022  ***  Ms. ROSA reports that she has not had any additional seizures since her last visit 2-1/2 months ago.  She is tolerating the increased dose of zonisamide 200 mg at bedtime without side effects.  She continues taking lacosamide 200 mg twice a day. Ms. ROSA endorses that she continues having problems with her memory, and is often frustrated that simple tasks take her much longer to perform than she feels they should.  She is not having any pressure from her workplace, on the contrary they often ask her why she is so upset.  At last visit, she was given referral to see neuropsychology for follow-up testing, but has not made an appointment.  *** 2021  ***  Ms. ROSA reports that she had break through seizure on Oct 4 2021. She does not think that she missed doses or was sleep deprived. currently taking Vimpat 200 q12, zonisamide 100 mg qHS.   Ms. ROSA endorses subjectively worse memory, and that she is slow to do things-suggesting possibly some executive dysfunction. Ms. ROSA is not currently planning to have children, but does not rule out the possibility in the future.  *** 06/15/2021  ***  Ms. ROSA reports only 1 seizure since surgery 2020 (KENYON).  Ms. ROSA was very stressed over the 2 days prior to seizure.  Relative reports that she postured, lasted about 30 sec, typical semiology.    Vimpat 200 mg q12 zonisamide 100 mg qHS folic acid.  *** 2020  *** -Appointment was conducted by video-conference in place of office appointment due to due to heightened concern for coronavirus infection risk. -Verbal consent given on May 24, 2020 at 15:41 by the patient SHARON ROSA ( Dec 25 1992) who understands that tele-visit will be charged to insurance and may involve co-pay for patient. -Video Platform: [email or web address]  -Physician location: home -Patient location: home - [address] [place] [address], [place] [address]  -Individuals on call: Dr. Serna, SHARON ROSA underwent stereo-EEG last January -  seizures recorded, 14/16 began in R mesial temporal structures with secondary initiatin of seizure in left hemisphere, 2/14 were seen first in left mesial temporal contacts - thougth implain in the left was sparse compared to the right. Ms. ROSA has continued to have seizures over the last few months. Approximately 1-2 per month. No convulsions.  Ms. ROSA is interested in surgery, and has met with Dr. Felipe to discuss options. Currently considering R temporal resection, though alternative could be RNS for further data collection.  On the other hand, R temporal lobectomy could be followed by RNS if residual seizures were not controlled by medication.   Ms. ROSA identifies memory loss as one of her greatest concerns and problems.   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 She was going to be evaluated for epilepsy surgery, but became uninsured. She is now again insured and returns for follow-up.  She reports that she had convulsion this morning and continues to get frequent complex partial seizures. She also complains of marked worsening of memory. She had recent neuropsychology evaluation, results pending.    *** 2018 *** 24 y/o RH woman from [place] with history of temporal lobe epilepsy diagnosed with at age 16. First episode happened at 11yo, described as confused feeling, with feeling of maddi vu and throat tightness without motor symptoms. She reports her events now consist of a as a preceding aura of a maddi-vu feeling lump in her throat followed by sense of confusion, stiffening of the body and oral automatisms.   Seizures last 1-2 mins. She has 3-5 of her typical seizures monthly, especially clustering around the time of her periods. Since increasing her dose of keppra and lamotrigine at discharge 5d ago pt has had a typical aura but did not progress.  Patient recalls that she may have seizures if she get more emotional or upset.  Seizures are usually worst guillermo-menstrually, usually 3-4 days before menses.    Current AEDs - Patient is currently taking Keppra 750 mg BID and lamotrigine 100 mg BID  Past AEDs - Keppra, carbamazepine (did not seem to help as much - got more post-ictal HA while taking cbz), phenobarbital, phenytoin. Has not tried other AEDs.  All - None  Soc - tobacco, no ETOH  FH - NC  ROS - review of 14 systems negative.  *** Recent Hospital Eval *** 24 y/o RH woman from [place] with history of temporal lobe epilepsy admitted to EMU s/p multiple seizures today. Pt was diagnosed with temporal lobe epilepsy at age 16. She reports her events as a preceding aura of a clump in her throat followed by sense of confusion and witnessed tonic clonic activity in all extremities. Oral automatisms may have been observed. Events last 1-2 mins. In the past she was treated with Carbamazepine, Dilantin, and Phenobarbital (unclear if intolerance or medication failure). Patient is currently taking Keppra 750 mg BID and lamotrigine 100 mg BID. She continues to have 3-5 of her typical seizures monthly, especially clustering around the time of her periods.  Patient had 5 witnessed seizures at home this month. Initially they were typical length and she returned to baseline mental status afterwards. Her last 3 seizures lasted 5 mins each, were clustered together, and she did not return to baseline mental status. She was taken to [facility], where she had another seizure. There, she received 2 mg ativan and 500 mg phenobarbital IV. CTH was normal.

## 2025-07-27 NOTE — ASSESSMENT
[FreeTextEntry1] : SHARON GARCIA, 31 yo RHF PMHs significant for KENYON July 2020, right temporal with recent nocturnal convulsion June 9 2025 with pos missed ZNS for 3 days. No 911 or ER visit, Next day she was diagnosed with  w/IUFD, had labor induction 6/19 at 28 weeks. Cytology pending,  No further seizures. On Lacosamide 200 mg bid and Zonisamide not sure of the dose 300 mg or 400 qhs.   There were multiple email communications with OB and patient, with concerns of the ASM noncompliance ie taking lower than recommended ZNS dose. Patient insight is unclear, recommended to come for the next alfonzo with family member/partner.    Plan: -  Updated aEEG 2 hr is recommended order placed. -   MRI brain w/wo gado comp 2020  -  Cont  Lacosmide 200mg q12.well toleraated -  Reduce Zonisamide to 300 mg for one week, then 200 mg qhs and continue. Knows to hydrate.  -  Compliance again discussed at length. Patient was advised to present to ER if she does not have her ASMs. Pts insight is unclear. Recommended to come for the next alfonzo with family member/partner.  -  Discussed high risk of SUDEP d/t nocturnal sz  - She is driving. She expressed understanding of [place] rules restricting [place] for 12 months after last daytime seizure.   -  Reviewed seizure triggers stress,  -  all questions answered  -  RTC in 3 months with Dr Serna, know to reach out sooner prn   x time 25 min

## 2025-07-27 NOTE — HISTORY OF PRESENT ILLNESS
[FreeTextEntry1] : [facility]  Current meds: Lacosamide 200 mg bid Zonisamide 300 mg qhs or ?400mg qhs Folic acid  Prev  LEV Lamotrigine    *** 2025 ***  Maude # [id number] and partner is on the phone  SHARON ROSA, 33 yo RHF w/PMHs significant for KENYON 2020, right temporal, afterwards with brief auras here for a follow up. . she had nocturnal seizure, her partner heard a noise, denies convulsions, did not call 911, She felt tired next morning, went to see OBGYN was diagnosed w/IUFD, had labor induction. Delma 15/16/17 missed Zonisamiide 300 mg qhs as pharmacy did not give it, ? insurance not covering it During the discussion she changes the days to . Now she is on  mg bid, and zns not sure 300 mg or 400 mg. Here as was referrreed by OB to follow up on ASM doses reductons.      *** 2025 **** use of  Brian # [id number].  SHARON ROSA, 33 yo RHF PMHs significant for KENYON 2020, right temporal, afterwards with brief auras. Now pregnant 20 weeks feels good. SKYLER 12/10/2025. No interval sz on Lacosamide 200 mg bid and Zonisamide 200 mg qhs. Needs updated level.    ***2025*** accompanied by sister who helps to interpret SHARON ROSA, 33 yo RHF PMHs significant for KENYON 2020, right temporal.  She is pregnant about 3-4 weeks confirmed by home pregnancy test, she has not seen OB yet   She feels good. She has one healthy child 15 yo. she has not been on ASMs at that time, her seizures started afterwards.   Seizures started at age 18 had convulsive seizures, then had KENYON afterwards has brief auras, last 6 months ago. She is compliant with Lacosamide 200 mg bid and Zonisamide 200 mg qhs well tolerated, also on folic acid.     ***4/15/2024*** Ms Sharon Rosa is here today for a scheduled follow up office visit [id number] Jaimie  disconnected and reconnected with  ID# /[id number] Guilherme Hinds was pregnant but had an  on  2024   4 or 5 days before  she had 3 convulsions in the same day while she was asleep She reports one ne other reported episode of confusion/ [place] for one minute about one year ago Lacosamide 200 mg BID Zonosamide 200mg daily  *** 2022  ***  Ms. ROSA reports that she has not had any additional seizures since her last visit 2-1/2 months ago.  She is tolerating the increased dose of zonisamide 200 mg at bedtime without side effects.  She continues taking lacosamide 200 mg twice a day. Ms. ROSA endorses that she continues having problems with her memory, and is often frustrated that simple tasks take her much longer to perform than she feels they should.  She is not having any pressure from her workplace, on the contrary they often ask her why she is so upset.  At last visit, she was given referral to see neuropsychology for follow-up testing, but has not made an appointment.  *** 2021  ***  Ms. ROSA reports that she had break through seizure on Oct 4 2021. She does not think that she missed doses or was sleep deprived. currently taking Vimpat 200 q12, zonisamide 100 mg qHS.   Ms. ROSA endorses subjectively worse memory, and that she is slow to do things-suggesting possibly some executive dysfunction. Ms. ROSA is not currently planning to have children, but does not rule out the possibility in the future.  *** 06/15/2021  ***  Ms. ROSA reports only 1 seizure since surgery 2020 (KENYON).  Ms. ROSA was very stressed over the 2 days prior to seizure.  Relative reports that she postured, lasted about 30 sec, typical semiology.    Vimpat 200 mg q12 zonisamide 100 mg qHS folic acid.  *** 2020  *** -Appointment was conducted by video-conference in place of office appointment due to due to heightened concern for coronavirus infection risk. -Verbal consent given on May 24, 2020 at 15:41 by the patient SHARON ROSA ( Dec 25 1992) who understands that tele-visit will be charged to insurance and may involve co-pay for patient. -Video Platform: [email or web address]  -Physician location: home -Patient location: home - [address] [place] [address], [place] [address]  -Individuals on call: Dr. Serna, SHARON ROSA underwent stereo-EEG last January -  seizures recorded, 14/16 began in R mesial temporal structures with secondary initiatin of seizure in left hemisphere, 2/14 were seen first in left mesial temporal contacts - thougth implain in the left was sparse compared to the right. Ms. ROSA has continued to have seizures over the last few months. Approximately 1-2 per month. No convulsions.  Ms. ROSA is interested in surgery, and has met with Dr. Felipe to discuss options. Currently considering R temporal resection, though alternative could be RNS for further data collection.  On the other hand, R temporal lobectomy could be followed by RNS if residual seizures were not controlled by medication.   Ms. ROSA identifies memory loss as one of her greatest concerns and problems.   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 She was going to be evaluated for epilepsy surgery, but became uninsured. She is now again insured and returns for follow-up.  She reports that she had convulsion this morning and continues to get frequent complex partial seizures. She also complains of marked worsening of memory. She had recent neuropsychology evaluation, results pending.    *** 2018 *** 26 y/o RH woman from [place] with history of temporal lobe epilepsy diagnosed with at age 16. First episode happened at 11yo, described as confused feeling, with feeling of maddi vu and throat tightness without motor symptoms. She reports her events now consist of a as a preceding aura of a maddi-vu feeling lump in her throat followed by sense of confusion, stiffening of the body and oral automatisms.   Seizures last 1-2 mins. She has 3-5 of her typical seizures monthly, especially clustering around the time of her periods. Since increasing her dose of keppra and lamotrigine at discharge 5d ago pt has had a typical aura but did not progress.  Patient recalls that she may have seizures if she get more emotional or upset.  Seizures are usually worst guillermo-menstrually, usually 3-4 days before menses.    Current AEDs - Patient is currently taking Keppra 750 mg BID and lamotrigine 100 mg BID  Past AEDs - Keppra, carbamazepine (did not seem to help as much - got more post-ictal HA while taking cbz), phenobarbital, phenytoin. Has not tried other AEDs.  All - None  Soc - tobacco, no ETOH  FH - NC  ROS - review of 14 systems negative.  *** Recent Hospital Eval *** 26 y/o RH woman from [place] with history of temporal lobe epilepsy admitted to EMU s/p multiple seizures today. Pt was diagnosed with temporal lobe epilepsy at age 16. She reports her events as a preceding aura of a clump in her throat followed by sense of confusion and witnessed tonic clonic activity in all extremities. Oral automatisms may have been observed. Events last 1-2 mins. In the past she was treated with Carbamazepine, Dilantin, and Phenobarbital (unclear if intolerance or medication failure). Patient is currently taking Keppra 750 mg BID and lamotrigine 100 mg BID. She continues to have 3-5 of her typical seizures monthly, especially clustering around the time of her periods.  Patient had 5 witnessed seizures at home this month. Initially they were typical length and she returned to baseline mental status afterwards. Her last 3 seizures lasted 5 mins each, were clustered together, and she did not return to baseline mental status. She was taken to [facility], where she had another seizure. There, she received 2 mg ativan and 500 mg phenobarbital IV. CTH was normal.

## 2025-07-27 NOTE — REASON FOR VISIT
[Follow-Up: _____] : a [unfilled] follow-up visit [Language Line ] : provided by [employer] [facility]   [Time Spent: ____ minutes] : Total time spent using  services: [unfilled] minutes. The patient's primary language is not English thus required  services. [Interpreters_IDNumber] : [id number] [Interpreters_FullName] : Maude [TWNoteComboBox1] : Swazi

## 2025-07-27 NOTE — REASON FOR VISIT
[Follow-Up: _____] : a [unfilled] follow-up visit [Language Line ] : provided by [employer]   [Time Spent: ____ minutes] : Total time spent using  services: [unfilled] minutes. The patient's primary language is not English thus required  services. [Interpreters_IDNumber] : [id number] [Interpreters_FullName] : Maude [TWNoteComboBox1] : Tristanian